# Patient Record
Sex: MALE | Race: WHITE | NOT HISPANIC OR LATINO | Employment: STUDENT | URBAN - METROPOLITAN AREA
[De-identification: names, ages, dates, MRNs, and addresses within clinical notes are randomized per-mention and may not be internally consistent; named-entity substitution may affect disease eponyms.]

---

## 2024-08-29 ENCOUNTER — OFFICE VISIT (OUTPATIENT)
Dept: URGENT CARE | Facility: CLINIC | Age: 24
End: 2024-08-29
Payer: MEDICAID

## 2024-08-29 VITALS
RESPIRATION RATE: 16 BRPM | HEART RATE: 90 BPM | DIASTOLIC BLOOD PRESSURE: 81 MMHG | BODY MASS INDEX: 24.91 KG/M2 | HEIGHT: 66 IN | SYSTOLIC BLOOD PRESSURE: 130 MMHG | WEIGHT: 155 LBS | TEMPERATURE: 99 F | OXYGEN SATURATION: 98 %

## 2024-08-29 DIAGNOSIS — M79.645 FINGER PAIN, LEFT: ICD-10-CM

## 2024-08-29 DIAGNOSIS — S69.92XA FINGER INJURY, LEFT, INITIAL ENCOUNTER: Primary | ICD-10-CM

## 2024-08-29 DIAGNOSIS — S60.512A ABRASION OF LEFT HAND, INITIAL ENCOUNTER: ICD-10-CM

## 2024-08-29 PROCEDURE — 73130 X-RAY EXAM OF HAND: CPT | Mod: LT,S$GLB,, | Performed by: RADIOLOGY

## 2024-08-29 RX ORDER — CEPHALEXIN 500 MG/1
500 CAPSULE ORAL EVERY 6 HOURS
Qty: 28 CAPSULE | Refills: 0 | Status: SHIPPED | OUTPATIENT
Start: 2024-08-29 | End: 2024-09-05

## 2024-08-29 RX ORDER — MUPIROCIN 20 MG/G
OINTMENT TOPICAL 2 TIMES DAILY
Qty: 30 G | Refills: 3 | Status: SHIPPED | OUTPATIENT
Start: 2024-08-29

## 2024-08-29 NOTE — PROGRESS NOTES
"Subjective:      Patient ID: Yoav Irizarry IV is a 24 y.o. male.    Vitals:  height is 5' 6" (1.676 m) and weight is 70.3 kg (155 lb). His oral temperature is 98.8 °F (37.1 °C). His blood pressure is 130/81 and his pulse is 90. His respiration is 16 and oxygen saturation is 98%.     Chief Complaint: Hand Pain    Patient presents to urgent care with left ring finger pain around 10:30 AM. Pain scale is 8/10. Patient has not used any OTC meds prior to arrival. Patient reports that he has used ice a few times today. Patient stated he stated he punched a windshield. Patient reports . Stated his finger tips were tingling earlier. Patient is left-hand dominant.    Hand Pain   His dominant hand is their left hand. The incident occurred 3 to 6 hours ago. The incident occurred at home. The injury mechanism was a direct blow. The pain is present in the left hand. The quality of the pain is described as aching. The pain radiates to the left hand. The pain is at a severity of 8/10. The pain is severe. The pain has been Constant since the incident. Associated symptoms include tingling. Pertinent negatives include no chest pain or muscle weakness. The symptoms are aggravated by palpation and movement. He has tried nothing for the symptoms. The treatment provided no relief.       Constitution: Negative for chills, sweating, fatigue and fever.   HENT:  Negative for ear pain, drooling, congestion, sore throat, trouble swallowing and voice change.    Neck: Negative for neck pain, neck stiffness, painful lymph nodes and neck swelling.   Cardiovascular:  Negative for chest pain, leg swelling, palpitations, sob on exertion and passing out.   Eyes:  Negative for eye discharge, eye itching, eye pain, eye redness and eyelid swelling.   Respiratory:  Negative for chest tightness, cough, sputum production, bloody sputum, shortness of breath, stridor and wheezing.    Gastrointestinal:  Negative for abdominal pain, abdominal bloating, " nausea, vomiting, constipation, diarrhea and heartburn.   Genitourinary:  Negative for urine decreased.   Musculoskeletal:  Positive for joint pain, joint swelling and abnormal ROM of joint. Negative for back pain, pain with walking, muscle cramps and muscle ache.   Skin:  Positive for abrasion and bruising. Negative for rash, erythema and hives.   Allergic/Immunologic: Negative for hives, itching and sneezing.   Neurological:  Negative for dizziness, light-headedness, passing out, loss of balance, headaches, altered mental status, loss of consciousness and seizures.   Hematologic/Lymphatic: Negative for swollen lymph nodes.   Psychiatric/Behavioral:  Negative for altered mental status and nervous/anxious. The patient is not nervous/anxious.       Objective:     Physical Exam   Constitutional: He is oriented to person, place, and time. He appears well-developed. He is cooperative.  Non-toxic appearance. He does not appear ill. No distress.   HENT:   Head: Normocephalic and atraumatic.   Ears:   Right Ear: External ear normal.   Left Ear: External ear normal.   Nose: Nose normal.   Mouth/Throat: Uvula is midline, oropharynx is clear and moist and mucous membranes are normal.   Eyes: Conjunctivae and lids are normal. No scleral icterus.   Neck: Trachea normal and phonation normal. Neck supple. No edema present. No erythema present. No neck rigidity present.   Cardiovascular: Normal rate, regular rhythm, normal heart sounds and normal pulses.   Pulmonary/Chest: Effort normal and breath sounds normal. No accessory muscle usage. No respiratory distress. He has no decreased breath sounds. He has no rhonchi.   Abdominal: Normal appearance.   Musculoskeletal:         General: No deformity.      Right hand: Normal.      Left hand: He exhibits decreased range of motion, tenderness, bony tenderness and swelling. He exhibits normal two-point discrimination and normal capillary refill. Normal sensation noted. Normal strength  noted.        Hands:    Neurological: He is alert and oriented to person, place, and time. He has normal sensation. He exhibits normal muscle tone. Gait normal. Coordination normal.   Skin: Skin is warm, dry, not diaphoretic, not pale, no rash, not vesicular and no abscessed. Capillary refill takes less than 2 seconds. abrasion, bruising and ecchymosis No burn, No erythema and No petechiae   Psychiatric: His speech is normal and behavior is normal. Judgment and thought content normal.   Nursing note and vitals reviewed.    XR HAND COMPLETE 3 VIEW LEFT  Result Date: 8/29/2024  EXAMINATION: XR HAND COMPLETE 3 VIEW LEFT CLINICAL HISTORY: . Pain in left finger(s) TECHNIQUE: PA, lateral, and oblique views of the left hand were performed. COMPARISON: 11/28/2018 FINDINGS: Three views left hand. No acute displaced fracture or dislocation of the hand.  No radiopaque foreign body.  There are degenerative changes of the 1st metacarpophalangeal joint, may be on the basis of prior injury.     1. No convincing acute displaced fracture or dislocation of the hand. Electronically signed by: Harley Booth MD Date:    08/29/2024 Time:    18:59       Finger Splint placed by MA today: NV intact. 2+ cap refill.   Abrasions prepped and cleaned with sterile water. Dressing applied with Bactroban, nonstick and coban by MA: NV intact. 2+ cap refill. Patient tolerated well without complications. Will place patient on oral/topical antibiotics and give instructions for at-home dressing changes. Patient aware, verbalized understanding and agreed with plan of care.    Assessment:     1. Finger injury, left, initial encounter    2. Finger pain, left    3. Abrasion of left hand, initial encounter        Plan:   Tetanus is UTD per patient. Discussed XRAY results with patient. Advised close follow-up with PCP and Ortho for further evaluation as needed. Strict ER precautions given to patient as well. Patient aware, verbalized understanding and  agreed with plan of care.    Finger injury, left, initial encounter  -     XR HAND COMPLETE 3 VIEW LEFT; Future; Expected date: 08/29/2024    Finger pain, left  -     XR HAND COMPLETE 3 VIEW LEFT; Future; Expected date: 08/29/2024    Abrasion of left hand, initial encounter    Other orders  -     cephALEXin (KEFLEX) 500 MG capsule; Take 1 capsule (500 mg total) by mouth every 6 (six) hours. for 7 days  Dispense: 28 capsule; Refill: 0  -     mupirocin (BACTROBAN) 2 % ointment; Apply topically 2 (two) times daily.  Dispense: 30 g; Refill: 3      There are no Patient Instructions on file for this visit.